# Patient Record
Sex: MALE | Race: WHITE
[De-identification: names, ages, dates, MRNs, and addresses within clinical notes are randomized per-mention and may not be internally consistent; named-entity substitution may affect disease eponyms.]

---

## 2019-03-06 ENCOUNTER — HOSPITAL ENCOUNTER (EMERGENCY)
Dept: HOSPITAL 88 - ER | Age: 64
Discharge: HOME | End: 2019-03-06
Payer: COMMERCIAL

## 2019-03-06 VITALS — WEIGHT: 186 LBS | HEIGHT: 70 IN | BODY MASS INDEX: 26.63 KG/M2

## 2019-03-06 VITALS — SYSTOLIC BLOOD PRESSURE: 120 MMHG | DIASTOLIC BLOOD PRESSURE: 76 MMHG

## 2019-03-06 DIAGNOSIS — R10.32: Primary | ICD-10-CM

## 2019-03-06 DIAGNOSIS — N13.30: ICD-10-CM

## 2019-03-06 DIAGNOSIS — M54.5: ICD-10-CM

## 2019-03-06 DIAGNOSIS — N20.0: ICD-10-CM

## 2019-03-06 LAB
ALBUMIN SERPL-MCNC: 3.9 G/DL (ref 3.5–5)
ALBUMIN/GLOB SERPL: 1.3 {RATIO} (ref 0.8–2)
ALP SERPL-CCNC: 108 IU/L (ref 40–150)
ALT SERPL-CCNC: 23 IU/L (ref 0–55)
ANION GAP SERPL CALC-SCNC: 13.1 MMOL/L (ref 8–16)
BACTERIA URNS QL MICRO: (no result) /HPF
BASOPHILS # BLD AUTO: 0 10*3/UL (ref 0–0.1)
BASOPHILS NFR BLD AUTO: 0.3 % (ref 0–1)
BILIRUB UR QL: NEGATIVE
BUN SERPL-MCNC: 20 MG/DL (ref 7–26)
BUN/CREAT SERPL: 14 (ref 6–25)
CALCIUM SERPL-MCNC: 9.2 MG/DL (ref 8.4–10.2)
CHLORIDE SERPL-SCNC: 108 MMOL/L (ref 98–107)
CLARITY UR: CLEAR
CO2 SERPL-SCNC: 24 MMOL/L (ref 22–29)
COLOR UR: YELLOW
DEPRECATED NEUTROPHILS # BLD AUTO: 5.2 10*3/UL (ref 2.1–6.9)
DEPRECATED RBC URNS MANUAL-ACNC: (no result) /HPF (ref 0–5)
EGFRCR SERPLBLD CKD-EPI 2021: 52 ML/MIN (ref 60–?)
EOSINOPHIL # BLD AUTO: 0.1 10*3/UL (ref 0–0.4)
EOSINOPHIL NFR BLD AUTO: 1.8 % (ref 0–6)
EPI CELLS URNS QL MICRO: (no result) /LPF
ERYTHROCYTE [DISTWIDTH] IN CORD BLOOD: 15.3 % (ref 11.7–14.4)
GLOBULIN PLAS-MCNC: 3.1 G/DL (ref 2.3–3.5)
GLUCOSE SERPLBLD-MCNC: 98 MG/DL (ref 74–118)
HCT VFR BLD AUTO: 38.6 % (ref 38.2–49.6)
HGB BLD-MCNC: 12.7 G/DL (ref 14–18)
KETONES UR QL STRIP.AUTO: NEGATIVE
LEUKOCYTE ESTERASE UR QL STRIP.AUTO: NEGATIVE
LIPASE SERPL-CCNC: 59 U/L (ref 8–78)
LYMPHOCYTES # BLD: 0.8 10*3/UL (ref 1–3.2)
LYMPHOCYTES NFR BLD AUTO: 11.7 % (ref 18–39.1)
MCH RBC QN AUTO: 28.2 PG (ref 28–32)
MCHC RBC AUTO-ENTMCNC: 32.9 G/DL (ref 31–35)
MCV RBC AUTO: 85.6 FL (ref 81–99)
MONOCYTES # BLD AUTO: 0.7 10*3/UL (ref 0.2–0.8)
MONOCYTES NFR BLD AUTO: 9.9 % (ref 4.4–11.3)
NEUTS SEG NFR BLD AUTO: 76 % (ref 38.7–80)
NITRITE UR QL STRIP.AUTO: NEGATIVE
NON-SQ EPI CELLS URNS QL MICRO: (no result)
PLATELET # BLD AUTO: 144 X10E3/UL (ref 140–360)
POTASSIUM SERPL-SCNC: 4.1 MMOL/L (ref 3.5–5.1)
PROT UR QL STRIP.AUTO: NEGATIVE
RBC # BLD AUTO: 4.51 X10E6/UL (ref 4.3–5.7)
SODIUM SERPL-SCNC: 141 MMOL/L (ref 136–145)
SP GR UR STRIP: 1.02 (ref 1.01–1.02)
UROBILINOGEN UR STRIP-MCNC: 0.2 MG/DL (ref 0.2–1)
WBC #/AREA URNS HPF: (no result) /HPF (ref 0–5)

## 2019-03-06 PROCEDURE — 36415 COLL VENOUS BLD VENIPUNCTURE: CPT

## 2019-03-06 PROCEDURE — 83690 ASSAY OF LIPASE: CPT

## 2019-03-06 PROCEDURE — 74177 CT ABD & PELVIS W/CONTRAST: CPT

## 2019-03-06 PROCEDURE — 85025 COMPLETE CBC W/AUTO DIFF WBC: CPT

## 2019-03-06 PROCEDURE — 80053 COMPREHEN METABOLIC PANEL: CPT

## 2019-03-06 PROCEDURE — 81001 URINALYSIS AUTO W/SCOPE: CPT

## 2019-03-06 PROCEDURE — 99285 EMERGENCY DEPT VISIT HI MDM: CPT

## 2019-03-06 NOTE — XMS REPORT
Continuity of Care Document

                             Created on: 2016



NEELIMA MILIAN

External Reference #: [id number]

: 1955

Sex: Male



Demographics







                          Address                   [address] [place] [address] 

[place]  [address]

 

                          Home Phone                [phone]

 

                          Preferred Language        Unknown

 

                          Marital Status            Unknown

 

                          Sikh Affiliation     Unknown

 

                          Race                      Unknown

 

                          Ethnic Group              Unknown





Author







                          Author                    Jayna kinney

 

                          Organization              Interface

 

                          Address                   Unknown

 

                          Phone                     Unavailable



                                                    



Problems

                    





                    Problem                            Status                            Onset Date     

                          Classification                            Date Reported       

                          Comments                            Source                    

 

                          M25.561 - PAIN IN RIGHT KNEE                            Active                    

                    2016                                                                     

                                                      [facility]                            Active                                       

                                                                                          

                                        [facility]                    



                                                                                
                       



Medications

                    





                    Medication                            Details                            Route      

                          Status                            Patient Instructions         

                          Ordering Provider                            Order Date           

                                        Source                    



                                                                        



Allergies, Adverse Reactions, Alerts

                    





                    Substance                            Category                            Reaction   

                          Severity                            Reaction type           

                          Status                            Date Reported                     

                          Comments                            Source                    



                                                                



Immunizations

                    





                    Immunization                            Date Given                            Site  

                          Status                            Last Updated             

                          Comments                            Source                    



                                                                        



Results

                    





                    Order Name                            Results                            Value      

                          Reference Range                            Date                

                          Interpretation                            Comments                       

                                        Source                    

 

                          Knee wo contrast MRI                            Knee wo contrast MRI              

                                        EXAM:  MRI of the right knee without contrast.



HISTORY:  Right knee pain, acute pain of right knee, avulsion fracture



COMPARISON:  Right knee x-rays on 2016 and 2005



TECHNIQUE:  Multiplanar, multisequence MRI of the right knee without contrast.



FINDINGS:

Intercondylar notch: There is mild to moderate degeneration or sprain of the 
anterior cruciate ligament. There appears to be a small intraligamentous 
ganglion cyst along the anterior cruciate ligament suggesting degeneration 
rather than sprain. There is also mild degeneration or sprain of the proximal 
posterior cruciate ligament. There is no tear of the anterior cruciate ligament 
or posterior cruciate ligament.



Collateral ligaments: The medial collateral ligament is intact. There is mild 
degeneration or sprain of the proximal lateral collateral ligament. There is no 
tear of the lateral collateral ligament.



Medial compartment: There is intrasubstance degeneration in the posterior horn 
and body of the medial meniscus. There is no medial meniscal tear. There is 
high-grade cartilage loss along the central weightbearing portion of the medial 
femoral condyle. There is no subchondral marrow edema in the medial compartment.
   



Lateral compartment: The lateral meniscus is intact. There is no chondral defect
or subchondral marrow edema in the lateral compartment.    



The posteromedial and posterolateral corner structures are intact.



Patellofemoral compartment: The patellar and quadriceps tendons are intact. 
There is partial-thickness cartilage loss and mild chondral fraying along the 
medial patellar facet and median patellar eminence. There is also high-grade 
cartilage loss along the inferomedial trochlea with minimal subchondral bone 
marrow edema.



Other findings: There is a small to moderate joint effusion with mild synovitis.
Trace fluid decompresses into the semimembranosus/gastrocnemius bursa. There is 
an osteochondral body anterior to the distal anterior cruciate ligament 
measuring up to 1 cm corresponding to the calcification seen on the prior x-ray.
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 
fracture and was present on the prior x-ray on 2005. There is a 
suprapatellar plica.



IMPRESSION:

                                        1. Mild to moderate degeneration or sprain of the anterior cruciate ligament. There

 appears to be a small intraligamentous ganglion cyst along the anterior 
cruciate ligament suggesting degeneration rather than sprain. There is also mild
degeneration or sprain of the proximal posterior cruciate ligament. There is 
also mild degeneration or sprain of the proximal lateral collateral ligament. 
There is no ligament tear.



                                        2. No meniscal tear. There is intrasubstance degeneration in the posterior horn 

and body of the medial meniscus.



                                        3. High-grade cartilage loss along the central weightbearing portion of the medial

 femoral condyle. 



                                        4. Partial thickness cartilage loss and mild chondral fraying along the medial patellar

 facet and median patellar eminence. There is also high-grade cartilage loss 
along the inferomedial trochlea with minimal subchondral bone marrow edema.



                                        5. Small to moderate joint effusion with mild synovitis. There is an osteochondral

 body anterior to the distal anterior cruciate ligament measuring up to 1 cm 
corresponding to the calcification seen on the prior x-ray. This is felt to 
represent an osteochondral body rather than an old avulsion fracture and was 
present on the prior x-ray on 2005.



  



                                                          2016                 

                                                      -

                                        -





Read by:  Ryder Mccall MD

Dictated Date/time:  16 15:44

Electronically Signed by:  Ryder Mccall MD                    16 
15:56

FINAL REPORT

                                        [facility]                    

 

                          Knee series 3 views DX                            Knee series 3 views DX          

                                        EXAM: XR Knee series 3 views DX



DATE: 2016 1:30 PM CDT



INDICATION: M25.561   Pain in right knee Pain.



COMPARISON: 



TECHNIQUE:  AP, oblique and lateral  projections of  right knee. 



DISCUSSION:  Small avulsion fragment is present along the intercondylar notch 
region. This is seen to be more displaced compared to the previous examination. 
Satisfactory alignment of the joint.  No soft tissue abnormality is identified.

                                        .

    



IMPRESSION: 

 Avulsion fracture from the intercondylar notch region likely represents  an 
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 
cruciate ligament attachment.



                                                          2016                 

                                                      -

                                        -





Read by:  Meryl Sung MD

Dictated Date/time:  16 15:17

Electronically Signed by:  Meryl Sung MD                    16 
15:19

FINAL REPORT

                                        [facility]                    



                                                                                
                               



Vital Signs

                    





                    Vital Sign                            Value                            Date         

                          Comments                            Source                    



                                                                        



Encounters

                    





                    Location                            Location Details                            Encounter

 Type                            Encounter Number                            Reason For

 Visit                            Attending Provider                            ADM Date

                            DC Date                            Status                

                                        Source                    

 

                    [facility] [place]                                                        OP Therapy Patients

                            [id number]                                             

                          Mark Javid                             2015                                                         [facility]

                    

 

                          [facility] [place]                                                

                          Outpt Diag Services                            [id number]                  

                                                Brigido Angel                             2016                                               

                                         [facility] [place]                                                

                          Outpt Diag Services                            [id number]                  

                                                Brigido Angel                             2016                                               

                                         [facility]                    



                                                                                
                           



Procedures

                    





                    Procedure                            Code                            Date           

                          Perfomer                            Comments                        

                                        Source

## 2019-03-06 NOTE — XMS REPORT
Summary of Care

                             Created on: 2019



NEELIMA MILIAN

External Reference #: [id number]

: 1955

Sex: Male



Demographics







                          Address                   [address] [place] [address] 

[place]  [address]

 

                          Preferred Language        English

 

                          Marital Status            Unknown

 

                          Ep[employer] Affiliation     Unknown

 

                          Race                      White

 

                          Ethnic Group              Non-





Author







                          Author                    Zofia Smith LVN

 

                          Organization              Unknown

 

                          Address                   [facility]



 

                          Phone                     Unavailable







Care Team Providers







                    Care Team Member Name    Role                Phone

 

                    Zofia Smith LVN    Unavailable         Unavailable

 

                    SABRINA HILL N.P.    Unavailable         Unavailable

 

                    REMY GROVER M.D.    Unavailable         Unavailable

 

                    REILLY DALEY [facility],  REMY STYLES    Unavailable         Unavailable

 

                          Unavailable               Unavailable







Functional Status







                    Name                Dates               Details

 

                                        Functional status health issues are not documented

                                                    Status: 









                    Name                Dates               Details

 

                                        Cognitive status health issues are not documented

                                                    Status: 







Problems







                    Name                Dates               Details

 

                                        Seasonal allergic rhinitis due to pollen (477.0, J30.1) 

                                                    Status: Active

 

                                        Encounter for screening for malignant neoplasm of prostate (V76.44, Z12.5) 

                                                    Status: Active

 

                                        History of Bursitis of left hip, unspecified bursa (726.5, M70.72) 

                                                    Status: Resolved

 

                                        Anemia (285.9, D64.9) 

                                                    Status: Active

 

                                        Bradycardia (427.89, R00.1) 

                                                    Status: Active

 

                                        Hiatal hernia (553.3, K44.9) 

                                                    Status: Active

 

                                        Long term use of drug (V58.69, Z79.899) 

                                                    Status: Active

 

                                        Psychophysiological insomnia (307.42, F51.04) 

                                                    Status: Active

 

                                        Skin atrophy (701.9, L90.9) 

                                                    Status: Active

 

                                        Flu vaccine need (V04.81, Z23) 

                                                    Status: Active

 

                                        Low back pain, episodic (724.2, M54.5) 

                                                    Status: Active

 

                                        Chronic bilateral low back pain with bilateral sciatica (724.2, M54.42) 

                                                    Status: Active

 

                                        Anxiety disorder (300.00, F41.9) 

                                                    Status: Active

 

                                        Testicular hypofunction (257.2, E29.1) 

                                                    Status: Active

 

                                        Colon cancer screening (V76.51, Z12.11) 

                                                    Status: Active

 

                                        Mixed hyperlipidemia (272.2, E78.2) 

                                                    Status: Active







Medications







                    Name                Dates               Details

 

                                        Atorvastatin Calcium 40 MG Oral Tablet

TAKE 1 TABLET DAILY AT BEDTIME



 

                                         Quantity: 1









REILLY QUEEN, REMY * 





                                         Start : 6-Oct-2014





Active

90 Tablet Bottle

Temazepam 30 MG Oral Capsule

TAKE 1 CAPSULE AT BEDTIME AS NEEDED.

* 





                           Quantity: 90              Refills: 0









REMY GROVER M.D. Active

AndroGel 40.5 MG/2.5GM (1.62%) Transdermal Gel

* 





                                         Refills: 0







Active

Naproxen 500 MG Oral Tablet

TAKE 1 TABLET TWICE DAILY. CON"T DOSING FOR 3-5 DAYS BEYOND PAIN RELIEF OR 3-4 W
EEKS MAX

* 





                           Quantity: 60              Refills: 5









REMY GROVER M.D. * 





                                         Start : 14-Aug-2018





Active

traMADol HCl - 50 MG Oral Tablet

1-2 PO Q6 hrs PRN

* 





                           Quantity: 80              Refills: 2









HILL N.P.SABRINA * 





                                         Start : 14-Aug-2018





Active

Escitalopram Oxalate 10 MG Oral Tablet

TAKE 1 TABLET DAILY.

* 





                           Quantity: 30              Refills: 3









REMY GROVER M.D. * 





                                         Start : 2018





Active





Allergies and Adverse Reactions







                    Name                Dates               Details

 

                    Flexeril TABS (Allergy)                        Status: Active









Past Medical History







                    Name                Dates               Details

 

                                        History of abdominal pain (V13.89, Z87.898) 

                                                    Status: Resolved

 

                                        History of acne (V13.3, Z87.2) 

                                                    Status: Resolved

 

                                        History of Acute bronchitis and bronchiolitis (466.0, J20.9) 

                                                    Status: Resolved

 

                                        History of Acute bronchitis due to infection (466.0, J20.8) 

                                                    Status: Resolved

 

                                        History of Acute pain of left knee (719.46, M25.562) 

                                                    Status: Resolved

 

                                        History of Acute pain of right knee (719.46, M25.561) 

                                                    Status: Resolved

 

                                        History of Acute upper respiratory infection (465.9, J06.9) 

                                                    Status: Resolved

 

                                        History of avulsion fracture (V15.51, Z87.81) 

                                                    Status: Resolved

 

                                        History of Back strain, initial encounter (847.9, S39.012A) 

                                                    Status: Resolved

 

                                        History of Bursitis of left hip, unspecified bursa (726.5, M70.72) 

                                                    Status: Resolved

 

                                        History of Cellulitis (682.9, L03.90) 

                                                    Status: Resolved

 

                                        History of Cheilitis (528.5, K13.0) 

                                                    Status: Resolved

 

                                        History of constipation (V12.79, Z87.19) 

                                                    Status: Resolved

 

                                        History of dermatitis (V13.3, Z87.2) 

                                                    Status: Resolved

 

                                        History of diverticulitis of colon (V1.79, Z87.19) 

                                                    Status: Resolved

 

                                        History of Influenza A (487.1, J10.1) 

                                                    Status: Resolved

 

                                        History of Need for Tdap vaccination (V06.1, Z23) 

                                                    Status: Resolved

 

                                        History of persistent cough (V12.69, Z87.09) 

                                                    Status: Resolved

 

                                        History of pharyngitis (V12.69, Z87.09) 

                                                    Status: Resolved

 

                                        History of Rotator cuff tendonitis (726.10, M75.80) 

                                                    Status: Resolved

 

                                        History of Sciatica of right side (724.3, M54.31) 

                                                    Status: Resolved

 

                                        History of Seasonal allergic rhinitis due to pollen (477.0, J30.1) 

                                                    Status: Resolved

 

                                        History of sore throat (V12.60, Z87.09) 

                                                    Status: Resolved

 

                                        History of vernal conjunctivitis (V12.49, Z86.69) 

                                                    Status: Resolved







Procedures







                    Procedure           Dates               Details

 

                    History of Rotator Cuff Repair                        Completed 



 

                    History of Hernia Repair                        Completed 



 

                    History of Rotator Cuff Repair                        Completed 1-Mar-2015









Immunization







                    Name                Dates               Details

 

                                        Influenza

                          on: 10-Nov-2011            

 

                                        Fluzone INJ

                          on: 9-Oct-2014             

 

                                        Fluzone INJ

Lot #: [id number]            on: 10-Oct-2015            

 

                                        Fluzone INJ

Lot #: [id number]            on: 28-Sep-2016            

 

                                        Fluzone Quadrivalent 0.5 ML Intramuscular Suspension

Lot #: [id number]           on: 6-Oct-2017             

 

                                        Tdap (Adacel)

Lot #: [id number]            on: 6-Oct-2017             

 

                                        Fluzone Quadrivalent 0.5 ML Intramuscular Suspension

Lot #: [id number]            on: 12-Oct-2018            







Family History







                    Name                Dates               Details

 

                                        Family history of disseminated malignant neoplasm (V16.9, Z80.9) 

                                                    Status: Active









                    Name                Dates               Details

 

                                        Family history of Poisoning By Ethyl Alcohol

                                                    Status: Active







Social History







                    Name                Dates               Details

 

                                        -

                                                    Status: 









                    Name                Dates               Details

 

                    Never smoker                             







Vital Signs







                Date            Test            Result          Details

 

                                                                 

 

                                        [id number]

                    BP Systolic         123 mm[Hg]          Status: Comments: Location: LUE; Position: Sitting



 

                    BP Diastolic        80 mm[Hg]           Status: Comments: Location: LUE; Position: Sitting



 

                    Height              70 in               Status: 



 

                    Weight              184 lb              Status: 



 

                    Body Mass Index Calculated    26.4 kg/m2          Status: 



 

                    Body Surface Area Calculated    2.01 m2             Status: 



 

                    Temperature         97.8 f              Status: Comments: Method: Temporal



 

                    Heart Rate          59 /min             Status: 



 

                    Respiration Rate    16 /min             Status: 









Results







                Date            Description     Value           Details

 

                    [id number]    [[facility]] LIPID PANEL      

 

                                CHOLESTEROL, TOTAL    154  mg/dl (Normal)    Range: <200





 

                                HDL CHOLESTEROL    58  mg/dl (Normal)    Range: >40





 

                                TRIGLYCERIDES    63  mg/dl (Normal)    Range: <150





 

                                LDL-CHOLESTEROL    82  {MG/DL__CAL} (Normal)    Comments: Reference range: <100 Desirable

 range <100 mg/dL for primary prevention;  <70 mg/dL for patients with CHD or 
diabetic patients with > or=2 CHD risk factors. LDL-C is now calculated using frances Landa calculation, which is a validated novel method providing better
 accuracy than the Friedewald equation in the estimation of LDL-C. Gaudencio SS et 
al. RAVEN. 2013;310(19): 0417-2369 (http:/
/education.[employer]/faq/UJY268)



 

                                CHOL/HDLC RATIO    2.7  {CALC} (Normal)    Range: <5.0





 

                                NON HDL CHOLESTEROL    96  {MG/DL__CAL} (Normal)    Range: <130

Comments: For patients with diabetes plus 1 major ASCVD risk factor, treating to
 a non-HDL-C goal of <100 mg/dL (LDL-C of <70 mg/dL) is considered a therapeutic
 option.



 

                    [id number]    [[facility]] CMP W/EGFR      

 

                                GLUCOSE         90  mg/dl (Normal)    Range: 65-99

Comments: Fasting reference interval



 

                                UREA NITROGEN (BUN)    10  mg/dl (Normal)    Range: 7-25





 

                                CREATININE      1.04  mg/dl (Normal)    Range: 0.70-1.25

Comments: For patients >49 years of age, the reference limitfor Creatinine is 
approximately 13% higher for peopleidentified as -American.



 

                                eGFR NON-    77  {ML/MIN/1.7} (Normal)    Range: > OR=60





 

                                eGFR     89  {ML/MIN/1.7} (Normal)    Range: > OR=60





 

                                BUN/CREATININE RATIO    NOT APPLICABLE  {CALC}    Range: 6-22





 

                                SODIUM          140  mmol/L (Normal)    Range: 135-146





 

                                POTASSIUM       4.4  mmol/L (Normal)    Range: 3.5-5.3





 

                                CHLORIDE        109  mmol/L (Normal)    Range: 





 

                                CARBON DIOXIDE    23  mmol/L (Normal)    Range: 20-32





 

                                CALCIUM         8.8  mg/dl (Normal)    Range: 8.6-10.3





 

                                PROTEIN, TOTAL    6.8  g/dl (Normal)    Range: 6.1-8.1





 

                                ALBUMIN         4.2  g/dl (Normal)    Range: 3.6-5.1





 

                                GLOBULIN        2.6  {G/DL__CALC} (Normal)    Range: 1.9-3.7





 

                                ALBUMIN/GLOBULIN RATIO    1.6  {CALC} (Normal)    Range: 1.0-2.5





 

                                BILIRUBIN, TOTAL    0.6  mg/dl (Normal)    Range: 0.2-1.2





 

                                ALKALINE PHSPHATASE    108  u/l (Normal)    Range: 





 

                                AST             22  u/l (Normal)    Range: 10-35





 

                                ALT             18  u/l (Normal)    Range: 9-46





 

                    :44    [[facility]] CBC (INCLUDES DIFF/PLT)      

 

                                WHITE BLOOD CELL COUNT    4.5  {Thousand/u} (Normal)    Range: 3.8-10.8





 

                                RED BLOOD CELL COUNT    4.89  {Million/uL} (Normal)    Range: 4.20-5.80





 

                                HEMAGLOBIN      13.5  g/dl (Normal)    Range: 13.2-17.1





 

                                HEMATOCRIT      40.9  % (Normal)    Range: 38.5-50.0





 

                                MCV             83.6  fL (Normal)    Range: 80.0-100.0





 

                                MCH             27.6  pg (Normal)    Range: 27.0-33.0





 

                                MCHC            33.0  g/dl (Normal)    Range: 32.0-36.0





 

                                RDW             14.9  % (Normal)    Range: 11.0-15.0





 

                                PLATELET COUNT    163  {Thousand/u} (Normal)    Range: 140-400





 

                                MPV             9.4  fL (Normal)    Range: 7.5-12.5





 

                                ABSOLUTE NEUTROPHILS    2556  {cells/uL} (Normal)    Range: 7681-9138





 

                                ABSOLUTE LYMPHOCYTES    1269  {cells/uL} (Normal)    Range: 850-3900





 

                                ABSOLUTE MONOCYTES    432  {cells/uL} (Normal)    Range: 200-950





 

                                ABSOLUTE EOSINOPHILS    212  {cells/uL} (Normal)    Range: 





 

                                ABSOLUTE BASOPHILS    32  {cells/uL} (Normal)    Range: 0-200





 

                                        NEUTROPHILS         56.8  % (Normal)  

 

                                        LYMPHOCYTES         28.2  % (Normal)  

 

                                        MONOCYTES           9.6  % (Normal)  

 

                                        EOSINOPHILS         4.7  % (Normal)  

 

                                        BASOPHILS           0.7  % (Normal)  

 

                    :44    [[facility]] TESTOSTERONE, TOTAL     Comments: REPORT COMMENT:FASTING:YES





 

                                TESTOSTERONE, TOTAL, MALES (ADULT), [place]    199  ng/dl (Below low threshold)    Range:

 250-827

Comments: In hypogonadal males, Testosterone, Total, LC/MS/MS,is the recommended
 assay due to the diminishedaccuracy of immunoassay at levels below 250 
ng/dL.This test code (57238) must be collected in ared-top tube with no gel.









Plan of Care







                    Name                Dates               Details

 

                                        Planned Observations 

 

                    Planned Goals not documented                         







Instructions







                    Name                Dates               Details

 

                                        Instructions not documented

                                                     







Encounters







                                        Appointment; SABRINA HILL NP 

Encounter Diagnosis: Problem not documented

                                        On: 16-Mar-2017 16:00



 

                                        Appointment; JEF JALLOH M.D. 

Encounter Diagnosis: Problem not documented

                                        On: 2017 14:00



 

                                        Appointment; JEF JALLOH M.D. 

Encounter Diagnosis: Problem not documented

                                        On: 2017 10:00



 

                                        Appointment; THERESA CEE NP 

Encounter Diagnosis: Problem not documented

                                        On: 2017 16:15



 

                                        Appointment; SABRINA HILL NP 

Encounter Diagnosis: Problem not documented

                                        On: 6-Oct-2017 14:00



 

                                        Appointment; SABRINA HILL NP 

Encounter Diagnosis: Problem not documented

                                        On: 2017 10:30



 

                                        Appointment; SABRINA HILL NP 

Encounter Diagnosis: Problem not documented

                                        On: 8-Dec-2017 13:30



 

                                        Appointment; THERESA CEE NP 

Encounter Diagnosis: Problem not documented

                                        On: 28-Dec-2017 17:45



 

                                        Appointment; SARAH KERR P.A. 

Encounter Diagnosis: Problem not documented

                                        On: 2018 12:30



 

                                        Appointment; SABRINA HILL NP 

Encounter Diagnosis: Problem not documented

                                        On: 2018 16:30



 

                                        Appointment; REMY GROVER M.D. 

Encounter Diagnosis: Problem not documented

                                        On: 2018 9:15



 

                                        Appointment; SABRINA HILL NP 

Encounter Diagnosis: Problem not documented

                                        On: 25-May-2018 10:45



 

                                        Appointment; SABRINA HILL NP 

Encounter Diagnosis: Problem not documented

                                        On: 14-Aug-2018 16:30



 

                                        Appointment; SABRINA HILL NP 

Encounter Diagnosis: Problem not documented

                                        On: 12-Oct-2018 10:00



 

                                        Appointment; SABRINA HILL NP 

Encounter Diagnosis: Problem not documented

                                        On: 2018 15:30



 

                                        Appointment; REMY GROVER M.D. 

Encounter Diagnosis: Problem not documented

                                        On: 2019 13:45

## 2019-03-06 NOTE — XMS REPORT
Clinical Summary

                             Created on: 2019



Hugh Hernandes

External Reference #: [id number]

: 1955

Sex: Male



Demographics







                          Address                   [address] [facility] [address] [place] [address]  [address]

 

                          Home Phone                [phone]

 

                          Preferred Language        English

 

                          Marital Status            

 

                          Buddhism Affiliation     Unknown

 

                          Race                      White

 

                          Ethnic Group              Non-





Author







                          Author                    [facility]

 

                          Organization              [facility]

 

                          Address                   Unknown

 

                          Phone                     Unavailable







Support







                Name            Relationship    Address         Phone

 

                Jeaneth Hernandes            Unknown         [phone]







Care Team Providers







                    Care Team Member Name    Role                Phone

 

                    Brigido Angel MD    PCP                 Unavailable







Allergies







                                        Comments



                 Active Allergy     Reactions       Severity        Noted Date 

 

                                        



Cold sweats



                     Cyclobenzaprine     Other (See          2018 



                                         Comments)   







Medications







                          End Date                  Status



              Medication     Sig          Dispensed     Refills      Start  



                                         Date  

 

                                                    Active



                     atorvastatin (LIPITOR) 40     Take 40 mg by       0   



                           MG tablet                 mouth daily.     

 

                                                    Active



                     temazepam (RESTORIL) 15     Take 15 mg by       0   



                           mg capsule                mouth nightly     



                                         as needed for     



                                         sleep.     

 

                                                    Active



                     testosterone (ANDROGEL) 1     Place 50 mg         0   



                           % (25 mg/2.5gram) gel in     on the skin     



                           packet                    daily.     







Active Problems





No known active problems



Encounters







                          Care Team                 Description



                     Date                Type                Specialty  

 

                                        



Venkat Aquino MD                            



                     2018          Anesthesia          General Surgery  



                                         Event   

 

                                        



Foreign Melgoza MD               Left Femoral/Obturator Coolies



                     2018          Surgery             General Surgery  

 

                                        



Foreign Melgoza MD                



                     2018          Hospital            General Surgery  



                                         Encounter   



after 2018



Social History







                                        Date



                 Tobacco Use     Types           Packs/Day       Years Used 

 

                                         



                                         Never Smoker    

 

    



                                         Smokeless Tobacco: Never   



                                         Used   









   



                 Alcohol Use     Drinks/Week     oz/Week         Comments

 

   



                                         No   









 



                           Sex Assigned at Birth     Date Recorded

 

 



                                         Not on file 









                                        Industry



                           Job Start Date            Occupation 

 

                                        Not on file



                           Not on file               Not on file 









                                        Travel End



                           Travel History            Travel Start 

 





                                         No recent travel history available.







Last Filed Vital Signs







                                        Time Taken



                           Vital Sign                Reading 

 

                                        2018  2:05 PM CDT



                           Blood Pressure            119/69 

 

                                        2018  2:05 PM CDT



                           Pulse                     51 

 

                                        2018  1:40 PM CDT



                           Temperature               36.3 C (97.3 F) 

 

                                        2018  2:05 PM CDT



                           Respiratory Rate          12 

 

                                        2018  2:05 PM CDT



                           Oxygen Saturation         98% 

 

                                        -



                           Inhaled Oxygen            - 



                                         Concentration  

 

                                        2018 11:45 AM CDT



                           Weight                    85.3 kg (188 lb) 

 

                                        2018 11:45 AM CDT



                           Height                    177.8 cm (5' 10") 

 

                                        2018 11:45 AM CDT



                           Body Mass Index           26.98 







Plan of Treatment





Not on file



Implants







                    Device Identifier    Shelf Expiration Date    Model / Serial / Lot



                 Implanted       Type            Area            Manufactur   



                                         er   

 

                                        2022          MCK2 17 100 4 /

[id number] /

[id number]



                 Coolief Multi-Cooled Radiofrequency     IPM             N/A: N/A        HALYARD   



                     Kit 100-4 - [id number] -     IMPLANT             HEALTH   



                           [id number]                DEVICES     



                                         Implanted: Qty: 1 on 2018 by      



                                         Foreign Melgoza MD      







Procedures







                                        Comments



                 Procedure Name     Priority        Date/Time       Associated Diagnosis 

 

                                         



                     RADIOFREQUENCY THERMAL      2018          Degenerative joint 



                     COAGULATION, NERVE,      12:30 PM CDT        disease 



                                         INTERCOSTAL    

 

  



                                         Special



                                         Needs



                                         BMI 28.0



after 2018



Results

Not on fileafter 2018



Insurance







     



            Payer      Benefit     Subscriber ID     Type       Phone      Address



                                         Plan /    



                                         Group    

 

     



                 BCBS            BCBS            xxxxxxxxxxxxxxx     PPO  



                                         CHOICE    



                                         PPO/[employer]IA [employer]ACKSON PPO    









     



            Guarantor Name     Account     Relation to     Date of     Phone      Billing Address



                     Type                Patient             Birth  

 

     



            Hugh Hernandes     Personal/F     Self       1955     [phone] [address] [facility]               (Home)              [address] [place] [address]







Advance Directives





Patient has advance care planning documents on file. For more information, brandon veliz contact:



Erick Hughes



[address] [facility] [place] [address]

## 2019-03-06 NOTE — NUR
Bedside report recieved from Jaya RN, assumed care at this time. Pt awake, 
alert and has no complaints at this time. no signs of acute distress noted.

## 2019-03-06 NOTE — XMS REPORT
Summary of Care: 5/28/15 - 6/26/15

                             Created on: 2082



NEELIMA MILIAN

External Reference #: [id number]

: 1955

Sex: Male



Demographics







                          Address                   [address] [place] [address]  [address]

 

                          Home Phone                [phone]

 

                          Preferred Language        English

 

                          Marital Status            

 

                          Rastafarian Affiliation     Jain

 

                          Race                      White/

 

                          Ethnic Group              Other





Author







                          Organization              Unknown

 

                          Address                   Unknown

 

                          Phone                     Unavailable







Encounter





[facility] Encntr_kail[facility](FIN) [id number] Date(s): 5/28/15 - 6/26/15

[facility]

Discharge Disposition: Home

Physician Attending: Mark Hua MD





Vital Signs





No data available for this section



Problem List





No data available for this section



Allergies, Adverse Reactions, Alerts







   



                 Substance       Reaction        Severity        Status

 

   



                           NKDA                      Active







Medications





No data available for this section



Results





No data available for this section



Immunizations





No data available for this section



Procedures





No data available for this section



Social History





No data available for this section



Assessment and Plan





No data available for this section

## 2019-03-06 NOTE — XMS REPORT
Summary of Care: 16 - 16

                             Created on: 2084



NEELIMA MILIAN

External Reference #: [id number]

: 1955

Sex: Male



Demographics







                          Address                   

[place]  [address]

 

                          Home Phone                [phone]

 

                          Preferred Language        English

 

                          Marital Status            

 

                          Restorationism Affiliation     Caodaism

 

                          Race                      White/

 

                          Ethnic Group              Unknown





Author







                          Author                    [facility]

 

                          Organization              [facility]

 

                          Address                   Unknown

 

                          Phone                     Unavailable







Encounter





[facility] Encntr[facility]ail[facility](FIN) [id number] Date(s): 16 - 16

[facility] [address] [facility] [place] [address] 
[phone]

Discharge Disposition: Home

Attending Physician: Brigido Angel MD





Vital Signs





No data available for this section



Problem List





No data available for this section



Allergies, Adverse Reactions, Alerts







   



                 Substance       Reaction        Severity        Status

 

   



                           NKDA                      Active







Medications





No data available for this section



Results





No data available for this section



Immunizations





No data available for this section



Procedures





No data available for this section



Social History





No data available for this section



Assessment and Plan





No data available for this section

## 2019-03-06 NOTE — DIAGNOSTIC IMAGING REPORT
EXAM: CT Abdomen and Pelvis WITH contrast  

INDICATION: Left lower quadrant pain

COMPARISON: None.

TECHNIQUE: Abdomen and pelvis were scanned utilizing a multidetector helical

scanner from the lung base to the pubic symphysis after administration of IV

contrast. Coronal and sagittal reformations were obtained. Routine protocol was

performed. Scan was performed when during portal venous phase.



Dose modulation, iterative reconstruction, and/or weight based adjustment of

the mA/kV was utilized to reduce the radiation dose to as low as reasonably

achievable. 



            IV CONTRAST: 100 mL of Isovue-370

            ORAL CONTRAST: 30 cc Gastrografin

            RADIATION DOSE: Total DLP: 818.47 mGy*cm

             Estimated effective dose: (DLP x 0.015 x size factor) mSv

            COMPLICATIONS: None



FINDINGS:



LINES and TUBES: None.



LOWER THORAX:  Lung bases clear. Heart size normal. There is wall thickening of

the distal esophagus which may be related to small hiatus hernia.



HEPATOBILIARY:      No focal hepatic lesions. No biliary ductal dilation. 



GALLBLADDER: No radio-opaque stones or sludge.  No wall thickening.



SPLEEN: No splenomegaly. 



PANCREAS: No focal masses or ductal dilatation.  



ADRENALS: No adrenal nodules    



KIDNEYS/URETERS: Kidneys enhance symmetrically.  There is mild left

hydronephrosis and hydroureter with a 6 mm calculus at the left UVJ. There is

no dilatation of the right collecting system or ureter. A punctate 1 to 2 mm

calcification adjacent to the distal right ureter likely represents partial

voluming of an adjacent calcification rather than a distal ureteral calculus on

the right. No cystic or solid mass lesions.  No intrarenal calculi are seen.



GI TRACT: No abnormal distention, wall thickening, or evidence of bowel

obstruction.  There is sigmoid diverticulosis with mild wall thickening that

may indicate chronic diverticular disease. No CT evidence for acute

diverticulitis.  Appendix is normal.



PELVIC ORGANS/BLADDER: Urinary bladder has an unremarkable appearance. No

discrete abnormal mass or fluid collection in the pelvis.



LYMPH NODES: No dominant lymph node mass is seen in the abdomen,

retroperitoneum or pelvis.



VESSELS: The abdominal aorta is atherosclerotic with calcified and noncalcified

plaque. IVC and portal system appear unremarkable.



PERITONEUM / RETROPERITONEUM: No pneumoperitoneum or ascites.



BONES: No acute or suspicious bony lesions. There is degenerative change at the

hips, most prominent on the left with subchondral cyst formation. There is

degenerative change in the lumbar spine. There is disc space narrowing at

L5-S1.



SOFT TISSUES: Superficial surrounding soft tissues show bilateral inguinal

hernias containing fat.            



IMPRESSION: 

1.  There is a 6 mm calculus at the left UVJ with mild left hydronephrosis and

hydroureter.



2.  Sigmoid diverticulosis with wall thickening suggesting chronic diverticular

disease. No CT evidence for acute diverticulitis.



3.  Wall thickening of the distal esophagus. This may be related to small

hiatus hernia although mucosal lesion is not excluded. Consider endoscopic

visualization.



4.  Degenerative changes in the lumbar spine and hips with advanced

degenerative change particularly at the left hip.





Staff: Kurt



Signed by: Dr. Adam Hicks M.D. on 3/6/2019 12:18 PM

## 2019-03-06 NOTE — XMS REPORT
Clinical Summary

                             Created on: 2019



Hugh Hernandes

External Reference #: [id number]

: 1955

Sex: Male



Demographics







                          Address                   [address] [facility] [address] [place] [address]  [address]

 

                          Home Phone                [phone]

 

                          Preferred Language        English

 

                          Marital Status            

 

                          Hindu Affiliation     Unknown

 

                          Race                      White

 

                          Ethnic Group              Non-





Author







                          Author                    [facility]

 

                          Organization              [facility]

 

                          Address                   Unknown

 

                          Phone                     Unavailable







Support







                Name            Relationship    Address         Phone

 

                Jeaneth Hernandes            Unknown         [phone]







Care Team Providers







                    Care Team Member Name    Role                Phone

 

                    Brigido Angel MD    PCP                 Unavailable







Allergies







                                        Comments



                 Active Allergy     Reactions       Severity        Noted Date 

 

                                        



Cold sweats



                     Cyclobenzaprine     Other (See          2018 



                                         Comments)   







Medications







                          End Date                  Status



              Medication     Sig          Dispensed     Refills      Start  



                                         Date  

 

                                                    Active



                     atorvastatin (LIPITOR) 40     Take 40 mg by       0   



                           MG tablet                 mouth daily.     

 

                                                    Active



                     temazepam (RESTORIL) 15     Take 15 mg by       0   



                           mg capsule                mouth nightly     



                                         as needed for     



                                         sleep.     

 

                                                    Active



                     testosterone (ANDROGEL) 1     Place 50 mg         0   



                           % (25 mg/2.5gram) gel in     on the skin     



                           packet                    daily.     







Active Problems





No known active problems



Encounters







                          Care Team                 Description



                     Date                Type                Specialty  

 

                                        



Venkat Aquino MD                            



                     2018          Anesthesia          General Surgery  



                                         Event   

 

                                        



Foreign Melgoza MD               Left Femoral/Obturator Coolies



                     2018          Surgery             General Surgery  

 

                                        



Foreign Melgoza MD                



                     2018          Hospital            General Surgery  



                                         Encounter   



after 2018



Social History







                                        Date



                 Tobacco Use     Types           Packs/Day       Years Used 

 

                                         



                                         Never Smoker    

 

    



                                         Smokeless Tobacco: Never   



                                         Used   









   



                 Alcohol Use     Drinks/Week     oz/Week         Comments

 

   



                                         No   









 



                           Sex Assigned at Birth     Date Recorded

 

 



                                         Not on file 









                                        Industry



                           Job Start Date            Occupation 

 

                                        Not on file



                           Not on file               Not on file 









                                        Travel End



                           Travel History            Travel Start 

 





                                         No recent travel history available.







Last Filed Vital Signs







                                        Time Taken



                           Vital Sign                Reading 

 

                                        2018  2:05 PM CDT



                           Blood Pressure            119/69 

 

                                        2018  2:05 PM CDT



                           Pulse                     51 

 

                                        2018  1:40 PM CDT



                           Temperature               36.3 C (97.3 F) 

 

                                        2018  2:05 PM CDT



                           Respiratory Rate          12 

 

                                        2018  2:05 PM CDT



                           Oxygen Saturation         98% 

 

                                        -



                           Inhaled Oxygen            - 



                                         Concentration  

 

                                        2018 11:45 AM CDT



                           Weight                    85.3 kg (188 lb) 

 

                                        2018 11:45 AM CDT



                           Height                    177.8 cm (5' 10") 

 

                                        2018 11:45 AM CDT



                           Body Mass Index           26.98 







Plan of Treatment





Not on file



Implants







                    Device Identifier    Shelf Expiration Date    Model / Serial / Lot



                 Implanted       Type            Area            Manufactur   



                                         er   

 

                                        2022          MCK2 17 100 4 /

[id number] /

[id number]



                 Coolief Multi-Cooled Radiofrequency     IPM             N/A: N/A        [employer]ARD   



                     Kit 100-4 - [id number] -     IMPLANT             HEALTH   



                           [id number]                DEVICES     



                                         Implanted: Qty: 1 on 2018 by      



                                         Foreign Melgoza MD      







Procedures







                                        Comments



                 Procedure Name     Priority        Date/Time       Associated Diagnosis 

 

                                         



                     RADIOFREQUENCY THERMAL      2018          Degenerative joint 



                     COAGULATION, NERVE,      12:30 PM CDT        disease 



                                         INTERCOSTAL    

 

  



                                         Special



                                         Needs



                                         BMI 28.0



after 2018



Results

Not on fileafter 2018



Insurance







     



            Payer      Benefit     Subscriber ID     Type       Phone      Address



                                         Plan /    



                                         Group    

 

     



                 BCBS            BCBS            xxxxxxxxxxxxxxx     PPO  



                                         CHOICE    



                                         PPO/[employer]EFANIA [employer]ACKSON PPO    









     



            Guarantor Name     Account     Relation to     Date of     Phone      Billing Address



                     Type                Patient             Birth  

 

     



            Hugh Hernandes     Personal/F     Self       1955     [phone] [address] [facility]               (Home)              [address] [place] [address]







Advance Directives





Patient has advance care planning documents on file. For more information, brandon veliz contact:



Erick Hughes



[address] [place] [address]

## 2019-03-06 NOTE — XMS REPORT
Summary of Care: 16 - 16

                             Created on: 2111



NEELIMA MILIAN

External Reference #: [id number]

: 1955

Sex: Male



Demographics







                          Address                   [address] [place] [address]  [id number]

 

                          Home Phone                [phone]

 

                          Preferred Language        English

 

                          Marital Status            

 

                          Restorationist Affiliation     Sikhism

 

                          Race                      White/

 

                          Ethnic Group              Unknown





Author







                          Author                    [facility]

 

                          Address                   Unknown

 

                          Phone                     Unavailable







Encounter





HQ Harlanr_kailyn(VINCE) [id number] Date(s): 16 - 16

[facility] [address] [facility], [address] [place], [place] [id number] [place] [address] 

Discharge Disposition: Home

Attending Physician: Brigido Angel MD





Vital Signs





No data available for this section



Problem List





No data available for this section



Allergies, Adverse Reactions, Alerts







   



                 Substance       Reaction        Severity        Status

 

   



                           NKDA                      Active







Medications





No data available for this section



Results





No data available for this section



Immunizations





No data available for this section



Procedures





No data available for this section



Social History





No data available for this section



Assessment and Plan





No data available for this section